# Patient Record
Sex: MALE | Race: AMERICAN INDIAN OR ALASKA NATIVE
[De-identification: names, ages, dates, MRNs, and addresses within clinical notes are randomized per-mention and may not be internally consistent; named-entity substitution may affect disease eponyms.]

---

## 2021-07-05 ENCOUNTER — HOSPITAL ENCOUNTER (EMERGENCY)
Dept: HOSPITAL 5 - ED | Age: 31
LOS: 1 days | Discharge: HOME | End: 2021-07-06
Payer: SELF-PAY

## 2021-07-05 DIAGNOSIS — R11.2: Primary | ICD-10-CM

## 2021-07-05 DIAGNOSIS — R10.84: ICD-10-CM

## 2021-07-05 DIAGNOSIS — E86.0: ICD-10-CM

## 2021-07-05 LAB
ALBUMIN SERPL-MCNC: 4.8 G/DL (ref 3.9–5)
ALT SERPL-CCNC: 11 UNITS/L (ref 7–56)
BASOPHILS # (AUTO): 0 K/MM3 (ref 0–0.1)
BASOPHILS NFR BLD AUTO: 0.6 % (ref 0–1.8)
BILIRUB DIRECT SERPL-MCNC: < 0.2 MG/DL (ref 0–0.2)
BUN SERPL-MCNC: 11 MG/DL (ref 9–20)
BUN/CREAT SERPL: 10 %
CALCIUM SERPL-MCNC: 9.6 MG/DL (ref 8.4–10.2)
EOSINOPHIL # BLD AUTO: 0 K/MM3 (ref 0–0.4)
EOSINOPHIL NFR BLD AUTO: 0.6 % (ref 0–4.3)
HCT VFR BLD CALC: 47 % (ref 35.5–45.6)
HEMOLYSIS INDEX: 6
HGB BLD-MCNC: 16 GM/DL (ref 11.8–15.2)
LYMPHOCYTES # BLD AUTO: 1.8 K/MM3 (ref 1.2–5.4)
LYMPHOCYTES NFR BLD AUTO: 27.3 % (ref 13.4–35)
MCHC RBC AUTO-ENTMCNC: 34 % (ref 32–34)
MCV RBC AUTO: 85 FL (ref 84–94)
MONOCYTES # (AUTO): 0.6 K/MM3 (ref 0–0.8)
MONOCYTES % (AUTO): 9.8 % (ref 0–7.3)
PLATELET # BLD: 181 K/MM3 (ref 140–440)
RBC # BLD AUTO: 5.53 M/MM3 (ref 3.65–5.03)

## 2021-07-05 PROCEDURE — 36415 COLL VENOUS BLD VENIPUNCTURE: CPT

## 2021-07-05 PROCEDURE — 80076 HEPATIC FUNCTION PANEL: CPT

## 2021-07-05 PROCEDURE — 96361 HYDRATE IV INFUSION ADD-ON: CPT

## 2021-07-05 PROCEDURE — 80048 BASIC METABOLIC PNL TOTAL CA: CPT

## 2021-07-05 PROCEDURE — C9113 INJ PANTOPRAZOLE SODIUM, VIA: HCPCS

## 2021-07-05 PROCEDURE — 96375 TX/PRO/DX INJ NEW DRUG ADDON: CPT

## 2021-07-05 PROCEDURE — 85025 COMPLETE CBC W/AUTO DIFF WBC: CPT

## 2021-07-05 PROCEDURE — 96374 THER/PROPH/DIAG INJ IV PUSH: CPT

## 2021-07-05 PROCEDURE — 83690 ASSAY OF LIPASE: CPT

## 2021-07-05 PROCEDURE — 81001 URINALYSIS AUTO W/SCOPE: CPT

## 2021-07-05 PROCEDURE — 99283 EMERGENCY DEPT VISIT LOW MDM: CPT

## 2021-07-05 PROCEDURE — 96376 TX/PRO/DX INJ SAME DRUG ADON: CPT

## 2021-07-06 VITALS — SYSTOLIC BLOOD PRESSURE: 124 MMHG | DIASTOLIC BLOOD PRESSURE: 72 MMHG

## 2021-07-06 LAB
BILIRUB UR QL STRIP: (no result)
BLOOD UR QL VISUAL: (no result)
MUCOUS THREADS #/AREA URNS HPF: (no result) /HPF
PH UR STRIP: 6 [PH] (ref 5–7)
RBC #/AREA URNS HPF: 1 /HPF (ref 0–6)
UROBILINOGEN UR-MCNC: 4 MG/DL (ref ?–2)
WBC #/AREA URNS HPF: 2 /HPF (ref 0–6)

## 2021-11-14 ENCOUNTER — HOSPITAL ENCOUNTER (EMERGENCY)
Dept: HOSPITAL 5 - ED | Age: 31
Discharge: ANOTHER HOSPITAL | End: 2021-11-14
Payer: SELF-PAY

## 2021-11-14 VITALS — SYSTOLIC BLOOD PRESSURE: 135 MMHG | DIASTOLIC BLOOD PRESSURE: 85 MMHG
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
## 2021-11-14 DIAGNOSIS — S62.610B: Primary | ICD-10-CM

## 2021-11-14 DIAGNOSIS — F12.10: ICD-10-CM
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
## 2021-11-14 DIAGNOSIS — S00.81XA: ICD-10-CM

## 2021-11-14 LAB
BUN SERPL-MCNC: 9 MG/DL (ref 9–20)
BUN/CREAT SERPL: 8 %
CALCIUM SERPL-MCNC: 9.6 MG/DL (ref 8.4–10.2)
HCT VFR BLD CALC: 42.7 % (ref 35.5–45.6)
HEMOLYSIS INDEX: 8
HGB BLD-MCNC: 14.3 GM/DL (ref 11.8–15.2)
INR PPP: 0.95 (ref 0.87–1.13)
MCHC RBC AUTO-ENTMCNC: 33 % (ref 32–34)
MCV RBC AUTO: 84 FL (ref 84–94)
PLATELET # BLD: 170 K/MM3 (ref 140–440)
RBC # BLD AUTO: 5.1 M/MM3 (ref 3.65–5.03)

## 2021-11-14 PROCEDURE — 83735 ASSAY OF MAGNESIUM: CPT

## 2021-11-14 PROCEDURE — 99285 EMERGENCY DEPT VISIT HI MDM: CPT

## 2021-11-14 PROCEDURE — G0480 DRUG TEST DEF 1-7 CLASSES: HCPCS

## 2021-11-14 PROCEDURE — 80320 DRUG SCREEN QUANTALCOHOLS: CPT

## 2021-11-14 PROCEDURE — 73120 X-RAY EXAM OF HAND: CPT

## 2021-11-14 PROCEDURE — 93005 ELECTROCARDIOGRAM TRACING: CPT

## 2021-11-14 PROCEDURE — 90471 IMMUNIZATION ADMIN: CPT

## 2021-11-14 PROCEDURE — 85610 PROTHROMBIN TIME: CPT

## 2021-11-14 PROCEDURE — 36415 COLL VENOUS BLD VENIPUNCTURE: CPT

## 2021-11-14 PROCEDURE — 71045 X-RAY EXAM CHEST 1 VIEW: CPT

## 2021-11-14 PROCEDURE — 82550 ASSAY OF CK (CPK): CPT

## 2021-11-14 PROCEDURE — 80048 BASIC METABOLIC PNL TOTAL CA: CPT

## 2021-11-14 PROCEDURE — 96365 THER/PROPH/DIAG IV INF INIT: CPT

## 2021-11-14 PROCEDURE — 90715 TDAP VACCINE 7 YRS/> IM: CPT

## 2021-11-14 PROCEDURE — 96375 TX/PRO/DX INJ NEW DRUG ADDON: CPT

## 2021-11-14 PROCEDURE — 85027 COMPLETE CBC AUTOMATED: CPT
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
## 2021-11-16 NOTE — ELECTROCARDIOGRAPH REPORT
[facility]

                                       

Test Date:    2021               Test Time:    04:07:53

Pat Name:     BARI SHIELDS           Department:   

Patient ID:   [id number]          Room:          

Gender:       M                        Technician:   DUNG LOVE

:          1990               Requested By: RUFUS CRAFT

Order Number: [id number]              Reading MD:   Sd Lr

                                 Measurements

Intervals                              Axis          

Rate:         89                       P:            63

VT:           146                      QRS:          29

QRSD:         86                       T:            36

QT:           356                                    

QTc:          433                                    

                           Interpretive Statements

Sinus rhythm

Normal ECG

No previous ECG available for comparison

Electronically Signed On 2021 11:25:14 EST by Sd Lr

## 2021-11-30 ENCOUNTER — HOSPITAL ENCOUNTER (EMERGENCY)
Dept: HOSPITAL 5 - ED | Age: 31
LOS: 1 days | End: 2021-12-01
Payer: SELF-PAY

## 2021-11-30 DIAGNOSIS — Z53.21: ICD-10-CM

## 2021-11-30 DIAGNOSIS — M79.606: Primary | ICD-10-CM

## 2024-05-21 NOTE — EMERGENCY DEPARTMENT REPORT
ED Abdominal Pain HPI





- General


Chief Complaint: Abdominal Pain


Stated Complaint: CHEST PAINS


Time Seen by Provider: 07/05/21 21:57


Source: patient


Mode of arrival: Ambulatory


Limitations: No Limitations





- History of Present Illness


Initial Comments: 





Patient is 30 years old male with no significant past medical history.  Patient 

presented to the ER complaining of abdominal pain, diffuse, crampy in nature 

with no radiation associated with nausea, vomiting and watery diarrhea.  Patient

stated that he ate a hot dog at [facility] approximately 5 days ago and since then

he is been having the symptoms.  Patient denied any fever or chills.  No other 

complaint.


MD Complaint: abdominal pain


-: days(s) (5)


Location: diffuse


Radiation: none


Migration to: no migration


Severity scale (0 -10): 3


Quality: cramping


Consistency: constant


Associated Symptoms: nausea, vomiting, diarrhea





- Related Data


                                    Allergies











Allergy/AdvReac Type Severity Reaction Status Date / Time


 


No Known Allergies Allergy   Verified 07/05/21 22:19














ED Review of Systems


ROS: 


Stated complaint: CHEST PAINS


Other details as noted in HPI





Comment: All other systems reviewed and negative


Constitutional: denies: chills, fever


Respiratory: denies: cough, shortness of breath, SOB with exertion


Cardiovascular: denies: chest pain, palpitations


Gastrointestinal: abdominal pain, nausea, vomiting, diarrhea


Genitourinary: denies: urgency, dysuria


Musculoskeletal: denies: back pain


Neurological: denies: headache, weakness, numbness, paresthesias, confusion





ED Past Medical Hx





- Past Medical History


Previous Medical History?: No





- Surgical History


Past Surgical History?: No





- Social History


Smoking Status: Never Smoker


Substance Use Type: None





ED Physical Exam





- General


Limitations: No Limitations


General appearance: alert, in no apparent distress





- Head


Head exam: Present: atraumatic, normocephalic, normal inspection





- Eye


Eye exam: Present: normal appearance, PERRL





- ENT


ENT exam: Present: mucous membranes dry





- Neck


Neck exam: Present: normal inspection, full ROM.  Absent: tenderness, 

meningismus





- Respiratory


Respiratory exam: Present: normal lung sounds bilaterally





- Cardiovascular


Cardiovascular Exam: Present: regular rate, normal rhythm, normal heart sounds





- GI/Abdominal


GI/Abdominal exam: Present: soft, normal bowel sounds.  Absent: distended, 

tenderness, guarding, rebound, rigid, organomegaly, mass, bruit, pulsatile mass,

hernia





- Extremities Exam


Extremities exam: Present: normal inspection, full ROM, normal capillary refill.

 Absent: tenderness, pedal edema, joint swelling, calf tenderness





- Back Exam


Back exam: Present: normal inspection, full ROM.  Absent: CVA tenderness (R), 

CVA tenderness (L)





- Neurological Exam


Neurological exam: Present: alert, oriented X3, CN II-XII intact





- Psychiatric


Psychiatric exam: Present: normal mood





- Skin


Skin exam: Present: warm, intact, normal color





ED Course


                                   Vital Signs











  07/05/21 07/05/21 07/05/21





  21:26 22:05 22:15


 


Temperature 99.0 F  


 


Pulse Rate 64 55 L 51 L


 


Respiratory 18 10 L 15





Rate   


 


Blood Pressure  131/83 122/86


 


O2 Sat by Pulse 99 99 98





Oximetry   














  07/05/21 07/05/21 07/05/21





  22:31 22:45 23:00


 


Temperature   


 


Pulse Rate 50 L 54 L 55 L


 


Respiratory 14 13 19





Rate   


 


Blood Pressure 126/87 132/81 124/80


 


O2 Sat by Pulse 100 100 100





Oximetry   














  07/05/21 07/05/21 07/05/21





  23:15 23:31 23:45


 


Temperature   


 


Pulse Rate   


 


Respiratory   





Rate   


 


Blood Pressure 124/77 119/70 119/83


 


O2 Sat by Pulse 97 100 99





Oximetry   














  07/06/21 07/06/21 07/06/21





  00:01 00:06 00:15


 


Temperature   


 


Pulse Rate   


 


Respiratory  18 





Rate   


 


Blood Pressure 126/80  120/75


 


O2 Sat by Pulse 99  99





Oximetry   














ED Medical Decision Making





- Lab Data


Result diagrams: 


                                 07/05/21 22:45





                                 07/05/21 22:45





- Medical Decision Making





Patient is 30 years old male with no significant past medical history.  Patient 

presented to the ER complaining of abdominal pain, diffuse, crampy in nature 

with no radiation associated with nausea, vomiting and watery diarrhea.  Patient

stated that he ate a hot dog at [facility] approximately 5 days ago and since then

he is been having the symptoms.  Patient denied any fever or chills.  No other 

complaint.





Labs reviewed and is unremarkable except for ketones in the urine.  Patient 

significantly dehydrated.  Patient received 2 L of normal saline and Zofran.  

Patient stated that he is feeling better.  No vomiting observed in the ER after 

Zofran.  Patient given prescription for Zofran and Bentyl and advised to follow-

up with his primary doctor in the next 2 to 3 days and to return to the ER if he

develop any new symptoms.


Critical care attestation.: 


If time is entered above; I have spent that time in minutes in the direct care 

of this critically ill patient, excluding procedure time.








ED Disposition


Clinical Impression: 


 Intractable nausea and vomiting, Acute dehydration





Disposition: DC-01 TO HOME OR SELFCARE


Is pt being admited?: No


Condition: Stable


Instructions:  Nausea and Vomiting, Adult, Dehydration, Adult


Referrals: 


PRIMARY CARE,MD [Primary Care Provider] - 3-5 Days


[facility] [Provider Group] - 3-5 Days Statement Selected